# Patient Record
Sex: FEMALE | Race: WHITE
[De-identification: names, ages, dates, MRNs, and addresses within clinical notes are randomized per-mention and may not be internally consistent; named-entity substitution may affect disease eponyms.]

---

## 2017-03-22 ENCOUNTER — HOSPITAL ENCOUNTER (EMERGENCY)
Dept: HOSPITAL 10 - E/R | Age: 37
Discharge: HOME | End: 2017-03-22
Payer: MEDICAID

## 2017-03-22 VITALS
BODY MASS INDEX: 53.92 KG/M2 | WEIGHT: 293 LBS | WEIGHT: 293 LBS | HEIGHT: 62 IN | HEIGHT: 62 IN | BODY MASS INDEX: 53.92 KG/M2

## 2017-03-22 DIAGNOSIS — J40: Primary | ICD-10-CM

## 2017-03-22 PROCEDURE — 99284 EMERGENCY DEPT VISIT MOD MDM: CPT

## 2017-03-22 NOTE — ERD
ER Documentation


Chief Complaint


Date/Time


DATE: 3/22/17 


TIME: 16:33


Chief Complaint


cough x 5 days





HPI


Patient is an otherwise healthy 36-year-old female who states that she has had 

a dry cough for the past 2 weeks. She states that during the first part of her 

illness she had a fever however the fever has not resolved. She has seen her 

primary care doctor who gave her prescription for albuterol, Z-Marino and cough 

syrup. She states she got a little bit better but she continues to have 

symptoms including dry cough that is worse at night. She denies nausea vomiting 

or diarrhea. Chest pain or palpitations or shortness of breath. She states Saucedo'

s helps temporarily.





ROS


All systems reviewed and are negative except as per history of present illness.





Medications


Home Meds


Active Scripts


Benzonatate* (Tessalon Perle*) 100 Mg Capsule, 100 MG PO Q8H Y for COUGH for 7 

Days, CAP


   Prov:DARIUSZ CONNORS PA-C         3/22/17


Prednisone (Prednisone) 50 Mg Tab, 50 MG PO DAILY, #5 TAB


   Prov:DARIUSZ CONNORS PA-C         3/22/17





Allergies


Allergies:  


Coded Allergies:  


     No Known Drug Allergy (Verified  Allergy, Unknown, 12/5/08)





FmHx


Family History:  No diabetes





Physical Exam


Vitals





Vital Signs








  Date Time  Temp Pulse Resp B/P Pulse Ox O2 Delivery O2 Flow Rate FiO2


 


3/22/17 16:09 98.1 73 20 140/70 99   








Physical Exam


General: well developed, well nourished, alert, nontoxic, no distress





Head: normocephalic, atraumatic








Neck: Supple, nontender, no lymphadenopathy, no midline tenderness





Ears: no tenderness over mastoids bilaterally, TMs nonerythematous, no exudates 

in canal





Oropharynx: no tonsilar erythema or edema, uvula midline, no exudates, no 

kissing tonsils, no drooling





Respiratory: Clear to auscaultation bilaterally, speaks in full sentences, no 

use of accesory muscles or labored breathing, no rales, ronchi, or wheezing





Cardiovascular: RRR, No murmurs





GI: soft, non tender, non distended, negative murphys sign, negative mcburneys 

point tenderness, no cva tenderness bilaterally, no rebound or guarding





Back: no midline tenderness, no step offs or bony abnormalities, sensation to 

light touch in tact





Procedures/MDM


Patient presents with cough and congestion for 2 weeks. She has already been 

treated with antibiotics and continues to have cough. She is well-appearing in 

no distress vital signs are within normal limits and she is afebrile. Her lungs 

are clear to auscultation bilaterally and I doubt she has pneumonia. I do not 

believe she needs further antibiotic treatment however I didn't hear 

prescription for a short course of prednisone as well as Tessalon Perles.

Recommended this patient follow up with her primary care doctor within 48 hours 

or return to the emergency room for any worsening of symptoms. However this 

time I do believe there is suitable for outpatient management. I answered all 

their questions and they agreed with the plan and were discharged home.





Departure


Diagnosis:  


 Primary Impression:  


 Bronchitis


Condition:  Stable


Patient Instructions:  Bronchitis, Antiobiotic Treatment (Adult)





Additional Instructions:  


Call your primary care doctor TOMORROW for an appointment during the next 1-2 

days.See the doctor sooner or return here if your condition worsens before your 

appointment time.











DARIUSZ CONNORS PA-C Mar 22, 2017 16:35

## 2017-11-07 ENCOUNTER — HOSPITAL ENCOUNTER (EMERGENCY)
Dept: HOSPITAL 10 - FTE | Age: 37
Discharge: HOME | End: 2017-11-07
Payer: COMMERCIAL

## 2017-11-07 VITALS
WEIGHT: 293 LBS | WEIGHT: 293 LBS | BODY MASS INDEX: 59.07 KG/M2 | BODY MASS INDEX: 59.07 KG/M2 | HEIGHT: 59 IN | HEIGHT: 59 IN

## 2017-11-07 VITALS — HEART RATE: 98 BPM | DIASTOLIC BLOOD PRESSURE: 72 MMHG | SYSTOLIC BLOOD PRESSURE: 117 MMHG

## 2017-11-07 DIAGNOSIS — I87.2: ICD-10-CM

## 2017-11-07 DIAGNOSIS — I10: Primary | ICD-10-CM

## 2017-11-07 PROCEDURE — 84703 CHORIONIC GONADOTROPIN ASSAY: CPT

## 2017-11-07 PROCEDURE — 81003 URINALYSIS AUTO W/O SCOPE: CPT

## 2017-11-07 PROCEDURE — 81001 URINALYSIS AUTO W/SCOPE: CPT

## 2017-11-07 PROCEDURE — 99283 EMERGENCY DEPT VISIT LOW MDM: CPT

## 2017-11-07 NOTE — ERD
ER Documentation


Chief Complaint


Chief Complaint


bilat feet swelling x 3 months, denies pain





HPI


35y/o female patient with morbid obesity, presents to the emergency department c

/o bilateral leg edema for approximately 3 months. Denies use of breath, chest 

pain, palpitations, no fever, chills, N/V/D.  Positive history of previous 

episodes. Treatment attempted: None





ROS


All systems reviewed and are negative except as per history of present illness.





Medications


Home Meds


Active Scripts


Hydrochlorothiazide* (Hydrochlorothiazide*) 25 Mg Tab, 25 MG PO DAILY, #30 TAB 

2 Refills


   Prov:MADDIE NICOLE MD         17


Benzonatate* (Tessalon Perle*) 100 Mg Capsule, 100 MG PO Q8H Y for COUGH for 7 

Days, CAP


   Prov:DARIUSZ CONNORS PA-C         3/22/17


Prednisone (Prednisone) 50 Mg Tab, 50 MG PO DAILY, #5 TAB


   Prov:DARIUSZ CONNORS PA-C         3/22/17





Allergies


Allergies:  


Coded Allergies:  


     No Known Drug Allergy (Verified  Allergy, Unknown, 08)





PMhx/Soc


Medical and Surgical Hx:  pt denies Medical Hx


History of Surgery:  Yes ()


Hx Alcohol Use:  No


Hx Substance Use:  No


Hx Tobacco Use:  No


Smoking Status:  Never smoker





FmHx


Denies family history of DM, HTN or CAD





Physical Exam


Vitals





Vital Signs








  Date Time  Temp Pulse Resp B/P Pulse Ox O2 Delivery O2 Flow Rate FiO2


 


17 10:48  98  117/72    


 


17 08:18 99.1 105 18 162/74 99   








Physical Exam


Const:  Alert, oriented in no distress


Head:   Atraumatic 


Eyes:    Normal Conjunctiva


ENT:    Normal External Ears, Nose and Mouth.


Neck:               Full range of motion..~ No meningismus.


Resp:    Clear to auscultation bilaterally


Cardio:    Regular rate and rhythm, no murmurs


Abd:    Soft, non tender, non distended. Normal bowel sounds


Skin:    No petechiae or rashes


Back:    No midline or flank tenderness


Ext:    Bilateral non-pitting lower edema, no signs of infection


Neur:    Awake and alert


Psych:    Normal Mood and Affect


Results 24 hrs





 Laboratory Tests








Test


  17


09:30


 


Urine Color YELLOW 


 


Urine Clarity


  SLIGHTLY


CLOUDY


 


Urine pH 5.0 


 


Urine Specific Gravity 1.016 


 


Urine Ketones NEGATIVEmg/dL 


 


Urine Nitrite NEGATIVEmg/dL 


 


Urine Bilirubin NEGATIVEmg/dL 


 


Urine Urobilinogen 1+mg/dL 


 


Urine Leukocyte Esterase NEGATIVELeu/ul 


 


Urine Microscopic RBC 1/HPF 


 


Urine Microscopic WBC 1/HPF 


 


Urine Squamous Epithelial


Cells FEW/HPF 


 


 


Urine Mucus MODERATE/HPF 


 


Urine Hemoglobin NEGATIVEmg/dL 


 


Urine Glucose NEGATIVEmg/dL 


 


Urine Total Protein NEGATIVEmg/dl 


 


Urine Pregnancy Test NEGATIVE 











Procedures/MDM


35y/o female patient morbidly obese, presents to the ED c/o bilateral 

nonpitting lower edema for last 3 months.  Vital signs stable except for mild 

elevation of the blood pressure, Physical exam unremarkable.  Differential 

diagnosis include but not limited to: Venous stasis, fluid retention, 

lymphangitis, no suspicion for cellulitis, low suspicion for DVT. 


Physical examination and clinical presentation consistent most likely with 

hypertension and venous stasis with fluid retention, most likely related to 

sodium intake.


During the ED course the patient remained stable and asymptomatic


Clinical impression discussed with patient who agrees with management. The 

patient is stable to be treated outpatient and will be discharged home with a 

Rx for hydrochlorothiazide.  A recommendation to increase exercise and decrease 

carbohydrate and sugar intake was given.


If symptoms persist, worsen or new symptoms develop, then patient is instructed 

to follow-up with the primary care provider.  If the patient is unable to see 

the primary care provider, then return to the ED immediately.





Departure


Diagnosis:  


 Primary Impression:  


 Hypertension


 Additional Impression:  


 Stasis edema of both lower extremities


Condition:  Stable





Additional Instructions:  


Muchas olegario por [facility] para hua servicio.





Esperamos que en hua visita a la kalyani de emergencia hua problema medico haya sido 

solucionado y que se sienta mucho mejor. 





Para estar seguros que hua mejoria sigue en proceso, le pedimos el favor de 

hacer fermin tan de seguimiento medico con hua doctor primario en los proximos 2-4 

george.





Lleve con usted estos documentos y las medicinas recetadas.





Si kiara sintomas empeoran y no puede arturo a hua doctor, por favor regrese a kalyani 

de emergencia.





En ti que usted no tenga un mdico de atencin primaria:


Llame al mdico o clnica comunitaria de referencia que aparece abajo wendy 

las horas de consultorio para hacer fermin tan para que le vean.





CLINICAS:


[facility]  [phone] [address] [facility]  [phone] [address] [facility] [phone] [address] [place] [id number] [phone] [place] [address] [phone] [facility] [phone] 


[address] MADDIE PRESSLEY MD 2017 09:11

## 2017-11-07 NOTE — ERD
ER Documentation


Chief Complaint


Chief Complaint


bilat feet swelling x 3 months, denies pain





HPI


37y/o female patient with morbid obesity, presents to the emergency department c

/o bilateral leg edema for approximately 3 months. Denies use of breath, chest 

pain, palpitations, no fever, chills, N/V/D.  Positive history of previous 

episodes. Treatment attempted: None





ROS


All systems reviewed and are negative except as per history of present illness.





Medications


Home Meds


Active Scripts


Hydrochlorothiazide* (Hydrochlorothiazide*) 25 Mg Tab, 25 MG PO DAILY, #30 TAB 

2 Refills


   Prov:MADDIE NICOLE MD         17


Benzonatate* (Tessalon Perle*) 100 Mg Capsule, 100 MG PO Q8H Y for COUGH for 7 

Days, CAP


   Prov:DARIUSZ CONNORS PA-C         3/22/17


Prednisone (Prednisone) 50 Mg Tab, 50 MG PO DAILY, #5 TAB


   Prov:DARIUSZ CONNORS PA-C         3/22/17





Allergies


Allergies:  


Coded Allergies:  


     No Known Drug Allergy (Verified  Allergy, Unknown, 08)





PMhx/Soc


Medical and Surgical Hx:  pt denies Medical Hx


History of Surgery:  Yes ()


Hx Alcohol Use:  No


Hx Substance Use:  No


Hx Tobacco Use:  No


Smoking Status:  Never smoker





FmHx


Denies family history of DM, HTN or CAD





Physical Exam


Vitals





Vital Signs








  Date Time  Temp Pulse Resp B/P Pulse Ox O2 Delivery O2 Flow Rate FiO2


 


17 10:48  98  117/72    


 


17 08:18 99.1 105 18 162/74 99   








Physical Exam


Const:  Alert, oriented in no distress


Head:   Atraumatic 


Eyes:    Normal Conjunctiva


ENT:    Normal External Ears, Nose and Mouth.


Neck:               Full range of motion..~ No meningismus.


Resp:    Clear to auscultation bilaterally


Cardio:    Regular rate and rhythm, no murmurs


Abd:    Soft, non tender, non distended. Normal bowel sounds


Skin:    No petechiae or rashes


Back:    No midline or flank tenderness


Ext:    Bilateral non-pitting lower edema, no signs of infection


Neur:    Awake and alert


Psych:    Normal Mood and Affect


Results 24 hrs





 Laboratory Tests








Test


  17


09:30


 


Urine Color YELLOW 


 


Urine Clarity


  SLIGHTLY


CLOUDY


 


Urine pH 5.0 


 


Urine Specific Gravity 1.016 


 


Urine Ketones NEGATIVEmg/dL 


 


Urine Nitrite NEGATIVEmg/dL 


 


Urine Bilirubin NEGATIVEmg/dL 


 


Urine Urobilinogen 1+mg/dL 


 


Urine Leukocyte Esterase NEGATIVELeu/ul 


 


Urine Microscopic RBC 1/HPF 


 


Urine Microscopic WBC 1/HPF 


 


Urine Squamous Epithelial


Cells FEW/HPF 


 


 


Urine Mucus MODERATE/HPF 


 


Urine Hemoglobin NEGATIVEmg/dL 


 


Urine Glucose NEGATIVEmg/dL 


 


Urine Total Protein NEGATIVEmg/dl 


 


Urine Pregnancy Test NEGATIVE 











Procedures/MDM


37y/o female patient morbidly obese, presents to the ED c/o bilateral 

nonpitting lower edema for last 3 months.  Vital signs stable except for mild 

elevation of the blood pressure, Physical exam unremarkable.  Differential 

diagnosis include but not limited to: Venous stasis, fluid retention, 

lymphangitis, no suspicion for cellulitis, low suspicion for DVT. 


Physical examination and clinical presentation consistent most likely with 

hypertension and venous stasis with fluid retention, most likely related to 

sodium intake.


During the ED course the patient remained stable and asymptomatic


Clinical impression discussed with patient who agrees with management. The 

patient is stable to be treated outpatient and will be discharged home with a 

Rx for hydrochlorothiazide.  A recommendation to increase exercise and decrease 

carbohydrate and sugar intake was given.


If symptoms persist, worsen or new symptoms develop, then patient is instructed 

to follow-up with the primary care provider.  If the patient is unable to see 

the primary care provider, then return to the ED immediately.





Departure


Diagnosis:  


 Primary Impression:  


 Hypertension


 Additional Impression:  


 Stasis edema of both lower extremities


Condition:  Stable





Additional Instructions:  


Muchas olegario por [facility] para hua servicio.





Esperamos que en hua visita a la kalyani de emergencia hua problema medico haya sido 

solucionado y que se sienta mucho mejor. 





Para estar seguros que hua mejoria sigue en proceso, le pedimos el favor de 

hacer fermin tan de seguimiento medico con hua doctor primario en los proximos 2-4 

george.





Lleve con usted estos documentos y las medicinas recetadas.





Si kiara sintomas empeoran y no puede arturo a hua doctor, por favor regrese a kalyani 

de emergencia.





En ti que usted no tenga un mdico de atencin primaria:


Llame al mdico o clnica comunitaria de referencia que aparece abajo wendy 

las horas de consultorio para hacer fermin tan para que le vean.





CLINICAS:


[facility]  [phone] [address] [facility]  [phone] [address] [facility] 003 [phone] [address] [place] [phone] [address] [place] [address] [phone] [facility] [phone] 


[address] MADDIE PRESSLEY MD 2017 09:11

## 2017-11-07 NOTE — ERD
ER Documentation


Chief Complaint


Chief Complaint


bilat feet swelling x 3 months, denies pain





HPI


37y/o female patient with morbid obesity, presents to the emergency department c

/o bilateral leg edema for approximately 3 months. Denies use of breath, chest 

pain, palpitations, no fever, chills, N/V/D.  Positive history of previous 

episodes. Treatment attempted: None





ROS


All systems reviewed and are negative except as per history of present illness.





Medications


Home Meds


Active Scripts


Hydrochlorothiazide* (Hydrochlorothiazide*) 25 Mg Tab, 25 MG PO DAILY, #30 TAB 

2 Refills


   Prov:MADDIE NICOLE MD         17


Benzonatate* (Tessalon Perle*) 100 Mg Capsule, 100 MG PO Q8H Y for COUGH for 7 

Days, CAP


   Prov:DARIUSZ CONNORS PA-C         3/22/17


Prednisone (Prednisone) 50 Mg Tab, 50 MG PO DAILY, #5 TAB


   Prov:DARIUSZ CONNORS PA-C         3/22/17





Allergies


Allergies:  


Coded Allergies:  


     No Known Drug Allergy (Verified  Allergy, Unknown, 08)





PMhx/Soc


Medical and Surgical Hx:  pt denies Medical Hx


History of Surgery:  Yes ()


Hx Alcohol Use:  No


Hx Substance Use:  No


Hx Tobacco Use:  No


Smoking Status:  Never smoker





FmHx


Denies family history of DM, HTN or CAD





Physical Exam


Vitals





Vital Signs








  Date Time  Temp Pulse Resp B/P Pulse Ox O2 Delivery O2 Flow Rate FiO2


 


17 10:48  98  117/72    


 


17 08:18 99.1 105 18 162/74 99   








Physical Exam


Const:  Alert, oriented in no distress


Head:   Atraumatic 


Eyes:    Normal Conjunctiva


ENT:    Normal External Ears, Nose and Mouth.


Neck:               Full range of motion..~ No meningismus.


Resp:    Clear to auscultation bilaterally


Cardio:    Regular rate and rhythm, no murmurs


Abd:    Soft, non tender, non distended. Normal bowel sounds


Skin:    No petechiae or rashes


Back:    No midline or flank tenderness


Ext:    Bilateral non-pitting lower edema, no signs of infection


Neur:    Awake and alert


Psych:    Normal Mood and Affect


Results 24 hrs





 Laboratory Tests








Test


  17


09:30


 


Urine Color YELLOW 


 


Urine Clarity


  SLIGHTLY


CLOUDY


 


Urine pH 5.0 


 


Urine Specific Gravity 1.016 


 


Urine Ketones NEGATIVEmg/dL 


 


Urine Nitrite NEGATIVEmg/dL 


 


Urine Bilirubin NEGATIVEmg/dL 


 


Urine Urobilinogen 1+mg/dL 


 


Urine Leukocyte Esterase NEGATIVELeu/ul 


 


Urine Microscopic RBC 1/HPF 


 


Urine Microscopic WBC 1/HPF 


 


Urine Squamous Epithelial


Cells FEW/HPF 


 


 


Urine Mucus MODERATE/HPF 


 


Urine Hemoglobin NEGATIVEmg/dL 


 


Urine Glucose NEGATIVEmg/dL 


 


Urine Total Protein NEGATIVEmg/dl 


 


Urine Pregnancy Test NEGATIVE 











Procedures/MDM


37y/o female patient morbidly obese, presents to the ED c/o bilateral 

nonpitting lower edema for last 3 months.  Vital signs stable except for mild 

elevation of the blood pressure, Physical exam unremarkable.  Differential 

diagnosis include but not limited to: Venous stasis, fluid retention, 

lymphangitis, no suspicion for cellulitis, low suspicion for DVT. 


Physical examination and clinical presentation consistent most likely with 

hypertension and venous stasis with fluid retention, most likely related to 

sodium intake.


During the ED course the patient remained stable and asymptomatic


Clinical impression discussed with patient who agrees with management. The 

patient is stable to be treated outpatient and will be discharged home with a 

Rx for hydrochlorothiazide.  A recommendation to increase exercise and decrease 

carbohydrate and sugar intake was given.


If symptoms persist, worsen or new symptoms develop, then patient is instructed 

to follow-up with the primary care provider.  If the patient is unable to see 

the primary care provider, then return to the ED immediately.





Departure


Diagnosis:  


 Primary Impression:  


 Hypertension


 Additional Impression:  


 Stasis edema of both lower extremities


Condition:  Stable





Additional Instructions:  


Muchas olegario por [facility] para hua servicio.





Esperamos que en hua visita a la kalyani de emergencia hua problema medico haya sido 

solucionado y que se sienta mucho mejor. 





Para estar seguros que hua mejoria sigue en proceso, le pedimos el favor de 

hacer fermin tan de seguimiento medico con hua doctor primario en los proximos 2-4 

george.





Lleve con usted estos documentos y las medicinas recetadas.





Si kiara sintomas empeoran y no puede arturo a hua doctor, por favor regrese a kalyani 

de emergencia.





En ti que usted no tenga un mdico de atencin primaria:


Llame al mdico o clnica comunitaria de referencia que aparece abajo wendy 

las horas de consultorio para hacer fermin tan para que le vean.





CLINICAS:


[facility]  [phone] [address] [facility]  [phone] [address] [facility] [phone] [address] [place] [address] [phone] [place] [address] [phone] Located within [facility] [phone] 


[address] MADDIE PRESSLEY MD 2017 09:11

## 2019-08-12 ENCOUNTER — HOSPITAL ENCOUNTER (EMERGENCY)
Dept: HOSPITAL 54 - ER | Age: 39
Discharge: HOME | End: 2019-08-12
Payer: MEDICAID

## 2019-08-12 VITALS — HEIGHT: 59 IN | BODY MASS INDEX: 59.07 KG/M2 | WEIGHT: 293 LBS

## 2019-08-12 VITALS — SYSTOLIC BLOOD PRESSURE: 147 MMHG | DIASTOLIC BLOOD PRESSURE: 87 MMHG

## 2019-08-12 DIAGNOSIS — J45.901: ICD-10-CM

## 2019-08-12 DIAGNOSIS — J06.9: Primary | ICD-10-CM

## 2019-08-12 PROCEDURE — 71045 X-RAY EXAM CHEST 1 VIEW: CPT

## 2019-08-12 PROCEDURE — 93005 ELECTROCARDIOGRAM TRACING: CPT

## 2019-08-12 PROCEDURE — 99283 EMERGENCY DEPT VISIT LOW MDM: CPT

## 2019-08-12 PROCEDURE — 94640 AIRWAY INHALATION TREATMENT: CPT

## 2019-08-12 NOTE — NUR
PT BIBSELF C/O COUGH X5 DAYS. PT AAOX4. RESPIRATIONS EVEN AND UNLABORED. NO 
ACUTE DISTRESS NOTED AT THIS TIME. WILL CONTINUE TO MONITOR.

## 2019-08-16 ENCOUNTER — HOSPITAL ENCOUNTER (EMERGENCY)
Dept: HOSPITAL 54 - ER | Age: 39
Discharge: HOME | End: 2019-08-16
Payer: MEDICAID

## 2019-08-16 VITALS — SYSTOLIC BLOOD PRESSURE: 120 MMHG | DIASTOLIC BLOOD PRESSURE: 61 MMHG

## 2019-08-16 VITALS — WEIGHT: 293 LBS | BODY MASS INDEX: 57.52 KG/M2 | HEIGHT: 60 IN

## 2019-08-16 DIAGNOSIS — J06.9: Primary | ICD-10-CM

## 2019-08-16 DIAGNOSIS — J45.909: ICD-10-CM

## 2022-08-16 NOTE — NUR
Patient discharged to home in stable condition. Written and verbal after care 
instructions given. Patient verbalizes understanding of instruction.Pt 
ambulatory with a steady gait Patient is here today for a routine sports physical examination.  Her physical exam is unremarkable.  Please see scanned documents.